# Patient Record
(demographics unavailable — no encounter records)

---

## 2024-10-24 NOTE — PROCEDURE
[FreeTextEntry6] : Indication:  Unable to adequately examine nasal passages and sinus drainage with anterior rhinoscopy. The patient has pnd throat discomfort  Scope # 47 Mild septal deviation is present on direct visualization on either side. Both inferior nasal turbinates are moderate in size with normal  appearing mucosa.  The sinus endoscope was introduced into the right nares exam right middle meatus reveals no mucopus, polyps or inflammation.  The middle turbinate is unremarkable. The scope was advanced and the sphenoethmoid region was inspected. The superior meatus and nasal vault are unremarkable.  The nasopharynx is unremarkable without inflammation or mass The sinus endoscope was introduced into the left nares exam of the left middle meatus reveals no mucopus, polyps or inflammation and the left middle turbinate is unremarkable. The scope was advanced and the sphenoethmoid region was inspected. The left superior meatus and nasal vault are unremarkable.

## 2024-10-24 NOTE — HISTORY OF PRESENT ILLNESS
[de-identified] : 45 year old female presents for swollen throat glands Seasonal and penicillin allergy 1 month of swollen throat glands, painful to palpate Globus sensation at times, PND clear. facial pressure neg hx gerd Allegra, occasional flonase Productive Cough 2 weeks ago R/o flu and covid. Tonsils removed 25 y ago No smoking, social etoh use.

## 2024-10-24 NOTE — REVIEW OF SYSTEMS
[As Noted in HPI] : as noted in HPI [Patient Intake Form Reviewed] : Patient intake form was reviewed [Negative] : Heme/Lymph [de-identified] : swollen throat glands

## 2024-10-24 NOTE — ASSESSMENT
[FreeTextEntry1] : pnd exam nose and pharynx unremarkable allergic rhinitis restart fluticasone use daily x 3 weeks consider switch to ipratropium